# Patient Record
Sex: MALE | Race: ASIAN | NOT HISPANIC OR LATINO | URBAN - METROPOLITAN AREA
[De-identification: names, ages, dates, MRNs, and addresses within clinical notes are randomized per-mention and may not be internally consistent; named-entity substitution may affect disease eponyms.]

---

## 2021-11-26 ENCOUNTER — EMERGENCY (EMERGENCY)
Facility: HOSPITAL | Age: 41
LOS: 1 days | Discharge: HOME | End: 2021-11-26
Attending: EMERGENCY MEDICINE
Payer: MEDICAID

## 2021-11-26 VITALS
TEMPERATURE: 98 F | OXYGEN SATURATION: 99 % | HEART RATE: 92 BPM | DIASTOLIC BLOOD PRESSURE: 74 MMHG | RESPIRATION RATE: 16 BRPM | WEIGHT: 167.99 LBS | HEIGHT: 63 IN | SYSTOLIC BLOOD PRESSURE: 133 MMHG

## 2021-11-26 PROCEDURE — 70450 CT HEAD/BRAIN W/O DYE: CPT | Mod: 26,MA

## 2021-11-26 PROCEDURE — 70450 CT HEAD/BRAIN W/O DYE: CPT | Mod: MA

## 2021-11-26 PROCEDURE — 99284 EMERGENCY DEPT VISIT MOD MDM: CPT | Mod: 25

## 2021-11-26 PROCEDURE — 99284 EMERGENCY DEPT VISIT MOD MDM: CPT

## 2021-11-26 PROCEDURE — 96372 THER/PROPH/DIAG INJ SC/IM: CPT

## 2021-11-26 RX ORDER — KETOROLAC TROMETHAMINE 30 MG/ML
30 SYRINGE (ML) INJECTION ONCE
Refills: 0 | Status: DISCONTINUED | OUTPATIENT
Start: 2021-11-26 | End: 2021-11-26

## 2021-11-26 RX ORDER — CYCLOBENZAPRINE HYDROCHLORIDE 10 MG/1
1 TABLET, FILM COATED ORAL
Qty: 14 | Refills: 0
Start: 2021-11-26 | End: 2021-12-02

## 2021-11-26 RX ADMIN — Medication 30 MILLIGRAM(S): at 18:51

## 2021-11-26 NOTE — ED ADULT NURSE NOTE - DISCHARGE DATE/TIME
26-Nov-2021 20:23 Dapsone Counseling: I discussed with the patient the risks of dapsone including but not limited to hemolytic anemia, agranulocytosis, rashes, methemoglobinemia, kidney failure, peripheral neuropathy, headaches, GI upset, and liver toxicity.  Patients who start dapsone require monitoring including baseline LFTs and weekly CBCs for the first month, then every month thereafter.  The patient verbalized understanding of the proper use and possible adverse effects of dapsone.  All of the patient's questions and concerns were addressed.

## 2021-11-26 NOTE — ED PROVIDER NOTE - OBJECTIVE STATEMENT
40 year old male with no significant PMHx presents to the ED with complaints of right sided localized occipital headache over upper neck area. The patient states he often gets tension headaches but this one is worse. He described the headache as throbbing in nature. He denies nausea, and vomiting. NKDA.

## 2021-11-26 NOTE — ED PROVIDER NOTE - NSFOLLOWUPINSTRUCTIONS_ED_ALL_ED_FT
[facility]                                                                          Tension Headache, Adult    A tension headache is a feeling of pain, pressure, or aching in the head that is often felt over the front and sides of the head. The pain can be dull, or it can feel tight (constricting). There are two types of tension headache:  •Episodic tension headache. This is when the headaches happen fewer than 15 days a month.      •Chronic tension headache. This is when the headaches happen more than 15 days a month during a 3-month period.      A tension headache can last from 30 minutes to several days. It is the most common kind of headache. Tension headaches are not normally associated with nausea or vomiting, and they do not get worse with physical activity.      What are the causes?  The exact cause of this condition is not known. Tension headaches are often triggered by stress, anxiety, or depression. Other triggers include:  •Alcohol.      •Too much caffeine or caffeine withdrawal.      •Respiratory infections, such as colds, flu, or sinus infections.      •Dental problems or teeth clenching.      •Tiredness (fatigue).      •Holding your head and neck in the same position for a long period of time, such as while using a computer.      •Smoking.      •Arthritis of the neck.        What are the signs or symptoms?  Symptoms of this condition include:  •A feeling of pressure or tightness around the head.      •Dull, aching head pain.      •Pain over the front and sides of the head.      •Tenderness in the muscles of the head, neck, and shoulders.        How is this diagnosed?    This condition may be diagnosed based on your symptoms, your medical history, and a physical exam. If your symptoms are severe or unusual, you may have imaging tests, such as a CT scan or an MRI of your head. Your vision may also be checked.      How is this treated?    This condition may be treated with lifestyle changes and with medicines that help relieve symptoms.      Follow these instructions at home:    Managing pain     •Take over-the-counter and prescription medicines only as told by your health care provider.      •When you have a headache, lie down in a dark, quiet room.    •If directed, apply ice to the head and neck:  •Put ice in a plastic bag.      •Place a towel between your skin and the bag.      •Leave the ice on for 20 minutes, 2–3 times a day.      •If directed, apply heat to the back of your neck as often as told by your health care provider. Use the heat source that your health care provider recommends, such as a moist heat pack or a heating pad.  •Place a towel between your skin and the heat source.      •Leave the heat on for 20–30 minutes.      •Remove the heat if your skin turns bright red. This is especially important if you are unable to feel pain, heat, or cold. You may have a greater risk of getting burned.        Eating and drinking     •Eat meals on a regular schedule.      •Limit alcohol intake to no more than 1 drink a day for nonpregnant women and 2 drinks a day for men. One drink equals 12 oz of beer, 5 oz of wine, or 1½ oz of hard liquor.      •Drink enough fluid to keep your urine pale yellow.      •Decrease your caffeine intake, or stop using caffeine.      Lifestyle     •Get 7–9 hours of sleep each night, or get the amount of sleep recommended by your health care provider.      •At bedtime, remove all electronic devices from your room. Electronic devices include computers, phones, and tablets.    •Find ways to manage your stress. Some things that can help relieve stress include:  •Exercise.      •Deep breathing exercises.      •Yoga.      •Listening to music.      •Positive mental imagery.        •Try to sit up straight and avoid tensing your muscles.      • Do not use any products that contain nicotine or tobacco, such as cigarettes and e-cigarettes. If you need help quitting, ask your health care provider.        General instructions      •Keep all follow-up visits as told by your health care provider. This is important.    •Avoid any headache triggers. Keep a headache journal to help find out what may trigger your headaches. For example, write down:  •What you eat and drink.      •How much sleep you get.      •Any change to your diet or medicines.          Contact a health care provider if:    •Your headache does not get better.      •Your headache comes back.      •You are sensitive to sounds, light, or smells because of a headache.      •You have nausea or you vomit.      •Your stomach hurts.        Get help right away if:  •You suddenly develop a very severe headache along with any of the following:  •A stiff neck.      •Nausea and vomiting.      •Confusion.      •Weakness.      •Double vision or loss of vision.      •Shortness of breath.      •Rash.      •Unusual sleepiness.      •Fever.      •Trouble speaking.      •Pain in your eyes or ears.      •Trouble walking or balancing.      •Feeling faint or passing out.          Summary    •A tension headache is a feeling of pain, pressure, or aching in the head that is often felt over the front and sides of the head.      •A tension headache can last from 30 minutes to several days. It is the most common kind of headache.      •This condition may be diagnosed based on your symptoms, your medical history, and a physical exam.      •This condition may be treated with lifestyle changes and with medicines that help relieve symptoms.      This information is not intended to replace advice given to you by your health care provider. Make sure you discuss any questions you have with your health care provider.      Document Revised: 10/14/2020 Document Reviewed: 03/30/2018    Elsevier Patient Education © 2021 [employer]

## 2021-11-26 NOTE — ED PROVIDER NOTE - NEUROLOGICAL, MLM
Localized tenderness to occiput. Alert and oriented, no focal deficits, no motor or sensory deficits.

## 2021-11-26 NOTE — ED PROVIDER NOTE - PATIENT PORTAL LINK FT
You can access the FollowMyHealth Patient Portal offered by [facility] by registering at the following website: [email or web address]. By joining [employer]’s FollowMyHealth portal, you will also be able to view your health information using other applications (apps) compatible with our system.

## 2021-11-26 NOTE — ED PROVIDER NOTE - CLINICAL SUMMARY MEDICAL DECISION MAKING FREE TEXT BOX
Give Toradol and perform head CT. Patient refuses getting a lidocaine injection to the area. Give Toradol and perform head CT. Patient refuses getting a lidocaine injection to the area.  patient is stable will discharge on muscle relaxant

## 2023-11-22 ENCOUNTER — EMERGENCY (EMERGENCY)
Facility: HOSPITAL | Age: 43
LOS: 1 days | Discharge: HOME | End: 2023-11-22
Attending: STUDENT IN AN ORGANIZED HEALTH CARE EDUCATION/TRAINING PROGRAM
Payer: MEDICAID

## 2023-11-22 VITALS
TEMPERATURE: 98 F | DIASTOLIC BLOOD PRESSURE: 75 MMHG | WEIGHT: 142.86 LBS | OXYGEN SATURATION: 97 % | HEART RATE: 102 BPM | SYSTOLIC BLOOD PRESSURE: 108 MMHG | RESPIRATION RATE: 22 BRPM

## 2023-11-22 VITALS
RESPIRATION RATE: 16 BRPM | OXYGEN SATURATION: 99 % | DIASTOLIC BLOOD PRESSURE: 80 MMHG | SYSTOLIC BLOOD PRESSURE: 112 MMHG | HEART RATE: 78 BPM | TEMPERATURE: 99 F

## 2023-11-22 PROCEDURE — 71046 X-RAY EXAM CHEST 2 VIEWS: CPT

## 2023-11-22 PROCEDURE — 71046 X-RAY EXAM CHEST 2 VIEWS: CPT | Mod: 26

## 2023-11-22 PROCEDURE — 99284 EMERGENCY DEPT VISIT MOD MDM: CPT

## 2023-11-22 PROCEDURE — 0225U NFCT DS DNA&RNA 21 SARSCOV2: CPT

## 2023-11-22 PROCEDURE — 99283 EMERGENCY DEPT VISIT LOW MDM: CPT | Mod: 25

## 2023-11-22 RX ADMIN — Medication 100 MILLIGRAM(S): at 14:34

## 2023-11-22 NOTE — ED PROVIDER NOTE - NSFOLLOWUPINSTRUCTIONS_ED_ALL_ED_FT
Acute Cough    WHAT YOU NEED TO KNOW:    What is an acute cough? An acute cough can last up to 3 weeks. Common causes of an acute cough include a cold, allergies, or a lung infection.    How is the cause of an acute cough diagnosed? Your healthcare provider will examine you and listen to your lungs. Tell your healthcare provider if you cough up any mucus, or have a fever or shortness of breath. Also tell your provider what makes the cough better or worse. Depending on your symptoms, you may need a chest x-ray. A sample of mucus may be collected and tested for infection.    How is an acute cough treated? An acute cough usually goes away on its own. Ask your healthcare provider about medicines you can take to decrease your cough. You may need medicine to stop the cough, decrease swelling in your airways, or help open your airways. Medicine may also be given to help you cough up mucus. If you have an infection caused by bacteria, you may need antibiotics.    What can I do to manage my cough?    Do not smoke and stay away from others who smoke. Nicotine and other chemicals in cigarettes and cigars can cause lung damage and make your cough worse. Ask your healthcare provider for information if you currently smoke and need help to quit. E-cigarettes or smokeless tobacco still contain nicotine. Talk to your healthcare provider before you use these products.    Drink extra liquids as directed. Liquids will help thin and loosen mucus so you can cough it up. Liquids will also help prevent dehydration. Examples of good liquids to drink include water, fruit juice, and broth. Do not drink liquids that contain caffeine. Caffeine can increase your risk for dehydration. Ask your healthcare provider how much liquid to drink each day.    Rest as directed. Do not do activities that make your cough worse, such as exercise.    Use a humidifier or vaporizer. Use a cool mist humidifier or a vaporizer to increase air moisture in your home. This may make it easier for you to breathe and help decrease your cough.    Eat 2 to 5 mL of honey 2 times each day. Honey can help thin mucus and decrease your cough.    Use cough drops or lozenges. These can help decrease throat irritation and your cough.  When should I seek immediate care?    You have trouble breathing or feel short of breath.    You cough up blood, or you see blood in your mucus.    You faint or feel weak or dizzy.    You have chest pain when you cough or take a deep breath.    You have new wheezing.  When should I contact my healthcare provider?    You have a fever.    Your cough lasts longer than 4 weeks.    Your symptoms do not improve with treatment.    You have questions or concerns about your condition or care.  CARE AGREEMENT:    You have the right to help plan your care. Learn about your health condition and how it may be treated. Discuss treatment options with your healthcare providers to decide what care you want to receive. You always have the right to refuse treatment.

## 2023-11-22 NOTE — ED PROVIDER NOTE - NS ED ROS FT
REVIEW OF SYSTEMS:  CONSTITUTIONAL: No weakness, fevers or chills  EYES: No conjunctiva redness, No eye discharge  ENT: + nasal congestion, No sore throat, No ear pain,   NECK: No pain or stiffness  RESPIRATORY: + cough, No wheezing, hemoptysis; No shortness of breath  CARDIOVASCULAR: No chest pain or palpitations  GASTROINTESTINAL: No abdominal pain. No nausea, vomiting, diarrhea or constipation.  GENITOURINARY: No dysuria, frequency or hematuria  NEUROLOGICAL: No headache, numbness   SKIN: No itching, rashes,   All other review of systems is negative unless indicated above.

## 2023-11-22 NOTE — ED PROVIDER NOTE - OBJECTIVE STATEMENT
43 yo M w/ no PMH present w/ 3wks h/o productive cough(yellow sputum), nasal congestion, runny nose, body aches and subjective fever. Pt reports visiting his PCP 2 wks ago and was prescribed Bromfed w/ no relief. He reports using OTC Rompe and Advil w/ no relief. Denies; chest pain, SOB, wheezing, Abdominal pain, N/V/D. 41 yo M w/ no PMH present w/ 3wks h/o productive cough(yellow sputum), nasal congestion, runny nose, body aches and subjective fever. Pt reports visiting his PCP 2 wks ago and was prescribed Bromfed w/ no relief. He reports using OTC Rompe and Advil w/ no relief. Denies; chest pain, SOB, wheezing, Abdominal pain, N/V/D. 43 yo M w/ no PMH present w/ 3wks h/o productive cough(yellow sputum), nasal congestion, runny nose, body aches and subjective fever. Pt reports visiting his PCP 2 wks ago and was prescribed Bromfed w/ no relief. He reports using OTC Rompe and Advil w/ no relief. Denies; chest pain, SOB, wheezing, Abdominal pain, N/V/D, sore throat, sick contacts, h/o Asthma 41 yo M w/ no PMH present w/ 3wks h/o productive cough(yellow sputum), nasal congestion, runny nose, body aches and subjective fever. Pt reports visiting his PCP 2 wks ago and was prescribed Bromfed w/ no relief. He reports using OTC Rompe and Advil w/ no relief. Denies; chest pain, SOB, wheezing, Abdominal pain, N/V/D, sore throat, sick contacts, h/o Asthma

## 2023-11-22 NOTE — ED PROVIDER NOTE - ATTENDING CONTRIBUTION TO CARE
42-year-old male with no pertinent past medical history presents with cold symptoms x 3 weeks.  Patient reports nasal congestion, rhinorrhea, body aches, chills, and cough for the past 3 weeks.  Patient states he saw his primary care doctor 2 weeks ago and was prescribed Bromfed with little improvement.  Denies any chest pain, difficulty breathing, vomiting, diarrhea, or rash.  Denies any history of tobacco use or asthma.  Denies any additional complaints. On exam, lungs CTAB, uvula midline, no exudates, no pooling of secretions. Patient well appearing, well hydrated appearing. Will obtain XR r/o infiltrate, RVP eval for viral syndrome, likely DC with supportive treatment/outpatient follow up/return precautions.

## 2023-11-22 NOTE — ED PROVIDER NOTE - PHYSICAL EXAMINATION
GENERAL: NAD,   HEAD:  Atraumatic, Normocephalic  EYES: clear conjunctiva, Clear sclera   ENT: Moist mucous membranes  NECK: Supple, No JVD  CHEST/LUNG: Clear to auscultation bilaterally; No rales, rhonchi, wheezing,   HEART: Regular rate and rhythm; No murmurs,   ABDOMEN: Bowel sounds present; Soft, Nontender, Nondistended, Negative Rovsing's sign   EXTREMITIES:  2+ Peripheral Pulses, . No clubbing, cyanosis, or edema  NERVOUS SYSTEM:  AAOX3, speech clear. No deficits   SKIN: No rashes or lesions

## 2023-11-22 NOTE — ED PROVIDER NOTE - PATIENT PORTAL LINK FT
You can access the FollowMyHealth Patient Portal offered by [facility] by registering at the following website: [email or web address]. By joining [employer]s FollowMyHealth portal, you will also be able to view your health information using other applications (apps) compatible with our system. You can access the FollowMyHealth Patient Portal offered by [facility] by registering at the following website: [email or web address]. By joining [employer]’s FollowMyHealth portal, you will also be able to view your health information using other applications (apps) compatible with our system. You can access the FollowMyHealth Patient Portal offered by [facility] by registering at the following website: [email or web address]. By joining [employer]’s FollowMyHealth portal, you will also be able to view your health information using other applications (apps) compatible with our system.

## 2023-11-22 NOTE — ED PROVIDER NOTE - NSSUBSTANCEUSE_GEN_ALL_CORE_SD
never used Abdomen soft, nontender, nondistended, bowel sounds present in all 4 quadrants. Abdomen soft, nontender, nondistended, bowel sounds present in all 4 quadrants. Abdomen soft, nontender, nondistended, bowel sounds present in all 4 quadrants.

## 2024-01-05 ENCOUNTER — EMERGENCY (EMERGENCY)
Facility: HOSPITAL | Age: 44
LOS: 1 days | Discharge: HOME | End: 2024-01-05
Attending: STUDENT IN AN ORGANIZED HEALTH CARE EDUCATION/TRAINING PROGRAM
Payer: MEDICAID

## 2024-01-05 VITALS
DIASTOLIC BLOOD PRESSURE: 69 MMHG | HEIGHT: 63 IN | HEART RATE: 78 BPM | WEIGHT: 166.01 LBS | OXYGEN SATURATION: 98 % | SYSTOLIC BLOOD PRESSURE: 109 MMHG | TEMPERATURE: 98 F | RESPIRATION RATE: 16 BRPM

## 2024-01-05 PROBLEM — Z78.9 OTHER SPECIFIED HEALTH STATUS: Chronic | Status: ACTIVE | Noted: 2023-11-22

## 2024-01-05 LAB
ANION GAP SERPL CALC-SCNC: 4 MMOL/L — LOW (ref 5–17)
ANION GAP SERPL CALC-SCNC: 4 MMOL/L — LOW (ref 5–17)
BASOPHILS # BLD AUTO: 0.07 K/UL — SIGNIFICANT CHANGE UP (ref 0–0.2)
BASOPHILS # BLD AUTO: 0.07 K/UL — SIGNIFICANT CHANGE UP (ref 0–0.2)
BASOPHILS NFR BLD AUTO: 1.1 % — SIGNIFICANT CHANGE UP (ref 0–2)
BASOPHILS NFR BLD AUTO: 1.1 % — SIGNIFICANT CHANGE UP (ref 0–2)
BUN SERPL-MCNC: 12 MG/DL — SIGNIFICANT CHANGE UP (ref 7–18)
BUN SERPL-MCNC: 12 MG/DL — SIGNIFICANT CHANGE UP (ref 7–18)
CALCIUM SERPL-MCNC: 9.9 MG/DL — SIGNIFICANT CHANGE UP (ref 8.4–10.5)
CALCIUM SERPL-MCNC: 9.9 MG/DL — SIGNIFICANT CHANGE UP (ref 8.4–10.5)
CHLORIDE SERPL-SCNC: 106 MMOL/L — SIGNIFICANT CHANGE UP (ref 96–108)
CHLORIDE SERPL-SCNC: 106 MMOL/L — SIGNIFICANT CHANGE UP (ref 96–108)
CO2 SERPL-SCNC: 28 MMOL/L — SIGNIFICANT CHANGE UP (ref 22–31)
CO2 SERPL-SCNC: 28 MMOL/L — SIGNIFICANT CHANGE UP (ref 22–31)
CREAT SERPL-MCNC: 0.98 MG/DL — SIGNIFICANT CHANGE UP (ref 0.5–1.3)
CREAT SERPL-MCNC: 0.98 MG/DL — SIGNIFICANT CHANGE UP (ref 0.5–1.3)
EGFR: 99 ML/MIN/1.73M2 — SIGNIFICANT CHANGE UP
EGFR: 99 ML/MIN/1.73M2 — SIGNIFICANT CHANGE UP
EOSINOPHIL # BLD AUTO: 0.08 K/UL — SIGNIFICANT CHANGE UP (ref 0–0.5)
EOSINOPHIL # BLD AUTO: 0.08 K/UL — SIGNIFICANT CHANGE UP (ref 0–0.5)
EOSINOPHIL NFR BLD AUTO: 1.2 % — SIGNIFICANT CHANGE UP (ref 0–6)
EOSINOPHIL NFR BLD AUTO: 1.2 % — SIGNIFICANT CHANGE UP (ref 0–6)
GLUCOSE SERPL-MCNC: 106 MG/DL — HIGH (ref 70–99)
GLUCOSE SERPL-MCNC: 106 MG/DL — HIGH (ref 70–99)
HCT VFR BLD CALC: 42.5 % — SIGNIFICANT CHANGE UP (ref 39–50)
HCT VFR BLD CALC: 42.5 % — SIGNIFICANT CHANGE UP (ref 39–50)
HGB BLD-MCNC: 14.8 G/DL — SIGNIFICANT CHANGE UP (ref 13–17)
HGB BLD-MCNC: 14.8 G/DL — SIGNIFICANT CHANGE UP (ref 13–17)
IMM GRANULOCYTES NFR BLD AUTO: 0.2 % — SIGNIFICANT CHANGE UP (ref 0–0.9)
IMM GRANULOCYTES NFR BLD AUTO: 0.2 % — SIGNIFICANT CHANGE UP (ref 0–0.9)
LYMPHOCYTES # BLD AUTO: 2.76 K/UL — SIGNIFICANT CHANGE UP (ref 1–3.3)
LYMPHOCYTES # BLD AUTO: 2.76 K/UL — SIGNIFICANT CHANGE UP (ref 1–3.3)
LYMPHOCYTES # BLD AUTO: 42.5 % — SIGNIFICANT CHANGE UP (ref 13–44)
LYMPHOCYTES # BLD AUTO: 42.5 % — SIGNIFICANT CHANGE UP (ref 13–44)
MCHC RBC-ENTMCNC: 27.6 PG — SIGNIFICANT CHANGE UP (ref 27–34)
MCHC RBC-ENTMCNC: 27.6 PG — SIGNIFICANT CHANGE UP (ref 27–34)
MCHC RBC-ENTMCNC: 34.8 GM/DL — SIGNIFICANT CHANGE UP (ref 32–36)
MCHC RBC-ENTMCNC: 34.8 GM/DL — SIGNIFICANT CHANGE UP (ref 32–36)
MCV RBC AUTO: 79.3 FL — LOW (ref 80–100)
MCV RBC AUTO: 79.3 FL — LOW (ref 80–100)
MONOCYTES # BLD AUTO: 0.65 K/UL — SIGNIFICANT CHANGE UP (ref 0–0.9)
MONOCYTES # BLD AUTO: 0.65 K/UL — SIGNIFICANT CHANGE UP (ref 0–0.9)
MONOCYTES NFR BLD AUTO: 10 % — SIGNIFICANT CHANGE UP (ref 2–14)
MONOCYTES NFR BLD AUTO: 10 % — SIGNIFICANT CHANGE UP (ref 2–14)
NEUTROPHILS # BLD AUTO: 2.92 K/UL — SIGNIFICANT CHANGE UP (ref 1.8–7.4)
NEUTROPHILS # BLD AUTO: 2.92 K/UL — SIGNIFICANT CHANGE UP (ref 1.8–7.4)
NEUTROPHILS NFR BLD AUTO: 45 % — SIGNIFICANT CHANGE UP (ref 43–77)
NEUTROPHILS NFR BLD AUTO: 45 % — SIGNIFICANT CHANGE UP (ref 43–77)
NRBC # BLD: 0 /100 WBCS — SIGNIFICANT CHANGE UP (ref 0–0)
NRBC # BLD: 0 /100 WBCS — SIGNIFICANT CHANGE UP (ref 0–0)
PLATELET # BLD AUTO: 238 K/UL — SIGNIFICANT CHANGE UP (ref 150–400)
PLATELET # BLD AUTO: 238 K/UL — SIGNIFICANT CHANGE UP (ref 150–400)
POTASSIUM SERPL-MCNC: 4.4 MMOL/L — SIGNIFICANT CHANGE UP (ref 3.5–5.3)
POTASSIUM SERPL-MCNC: 4.4 MMOL/L — SIGNIFICANT CHANGE UP (ref 3.5–5.3)
POTASSIUM SERPL-SCNC: 4.4 MMOL/L — SIGNIFICANT CHANGE UP (ref 3.5–5.3)
POTASSIUM SERPL-SCNC: 4.4 MMOL/L — SIGNIFICANT CHANGE UP (ref 3.5–5.3)
RBC # BLD: 5.36 M/UL — SIGNIFICANT CHANGE UP (ref 4.2–5.8)
RBC # BLD: 5.36 M/UL — SIGNIFICANT CHANGE UP (ref 4.2–5.8)
RBC # FLD: 12.8 % — SIGNIFICANT CHANGE UP (ref 10.3–14.5)
RBC # FLD: 12.8 % — SIGNIFICANT CHANGE UP (ref 10.3–14.5)
SODIUM SERPL-SCNC: 138 MMOL/L — SIGNIFICANT CHANGE UP (ref 135–145)
SODIUM SERPL-SCNC: 138 MMOL/L — SIGNIFICANT CHANGE UP (ref 135–145)
TROPONIN I, HIGH SENSITIVITY RESULT: 4.5 NG/L — SIGNIFICANT CHANGE UP
TROPONIN I, HIGH SENSITIVITY RESULT: 4.5 NG/L — SIGNIFICANT CHANGE UP
WBC # BLD: 6.49 K/UL — SIGNIFICANT CHANGE UP (ref 3.8–10.5)
WBC # BLD: 6.49 K/UL — SIGNIFICANT CHANGE UP (ref 3.8–10.5)
WBC # FLD AUTO: 6.49 K/UL — SIGNIFICANT CHANGE UP (ref 3.8–10.5)
WBC # FLD AUTO: 6.49 K/UL — SIGNIFICANT CHANGE UP (ref 3.8–10.5)

## 2024-01-05 PROCEDURE — 71046 X-RAY EXAM CHEST 2 VIEWS: CPT | Mod: 26

## 2024-01-05 PROCEDURE — 93010 ELECTROCARDIOGRAM REPORT: CPT

## 2024-01-05 PROCEDURE — 36415 COLL VENOUS BLD VENIPUNCTURE: CPT

## 2024-01-05 PROCEDURE — 85025 COMPLETE CBC W/AUTO DIFF WBC: CPT

## 2024-01-05 PROCEDURE — 71046 X-RAY EXAM CHEST 2 VIEWS: CPT

## 2024-01-05 PROCEDURE — 99285 EMERGENCY DEPT VISIT HI MDM: CPT

## 2024-01-05 PROCEDURE — 84484 ASSAY OF TROPONIN QUANT: CPT

## 2024-01-05 PROCEDURE — 96374 THER/PROPH/DIAG INJ IV PUSH: CPT

## 2024-01-05 PROCEDURE — 93005 ELECTROCARDIOGRAM TRACING: CPT

## 2024-01-05 PROCEDURE — 99284 EMERGENCY DEPT VISIT MOD MDM: CPT | Mod: 25

## 2024-01-05 PROCEDURE — 80048 BASIC METABOLIC PNL TOTAL CA: CPT

## 2024-01-05 RX ORDER — KETOROLAC TROMETHAMINE 30 MG/ML
15 SYRINGE (ML) INJECTION ONCE
Refills: 0 | Status: DISCONTINUED | OUTPATIENT
Start: 2024-01-05 | End: 2024-01-05

## 2024-01-05 RX ADMIN — Medication 15 MILLIGRAM(S): at 14:55

## 2024-01-05 RX ADMIN — Medication 15 MILLIGRAM(S): at 14:25

## 2024-01-05 NOTE — ED PROVIDER NOTE - PATIENT PORTAL LINK FT
You can access the FollowMyHealth Patient Portal offered by [facility] by registering at the following website: [email or web address]. By joining [employer]’s FollowMyHealth portal, you will also be able to view your health information using other applications (apps) compatible with our system. You can access the FollowMyHealth Patient Portal offered by [facility] by registering at the following website: [email or web address]. By joining [employer]’s FollowMyHealth portal, you will also be able to view your health information using other applications (apps) compatible with our system.

## 2024-01-05 NOTE — ED ADULT NURSE NOTE - OBJECTIVE STATEMENT
44 yo male sitting on a chair c/o cough and chest pain and rib pain when he coughs that started 1 month ago. 42 yo male sitting on a chair c/o cough and chest pain and rib pain when he coughs that started 1 month ago.

## 2024-01-05 NOTE — ED PROVIDER NOTE - NS ED ROS FT
positive: cp, cough  negative: f/c/congestion/hemoptysis/sputum production/rhinorrhea/sob/abdominal pain/n/v/d/dysuria/hematuria/leg edema/rash

## 2024-01-05 NOTE — ED PROVIDER NOTE - NSFOLLOWUPINSTRUCTIONS_ED_ALL_ED_FT
1. You were seen for chest pain. A copy of any of your resulted labs, imaging, and findings have been provided to you. Make sure to view any test results that may not have yet resulted at the time of your discharge by creating a FollowMyHealth account at: [email or web address] to sign up for FollowMyHealth. Please review all of your lab, imaging, and all other results in their entirety with your primary care doctor.   2. Continue to take your home medications as prescribed. Take over the counter motrin 600 mg with food every six hours (do not exceed 3,200 mg in a 24 hour period) and supplement with tylenol 650 mg every six hours (do not exceed 4000 mg of tylenol in a 24 hour period and do not drink alcohol while taking tylenol) between the motrin dosages to have a layered effect. Consult a pharmacist or your primary care doctor with any questions.   3. Follow up with your primary care doctor within 48 hours to assess the symptoms you were seen for in the emergency department and to review all results from your visit. If you don't have a doctor, call [phone] to make an appointment.  4. Return immediately to the emergency department for new, persistent, or worsening symptoms or signs. Return immediately to the emergency department if you have chest pain, shortness of breath, loss of consciousness, chest pain, sweating, or fever, or coughing up blood, or leg swelling.   5. For your for health, you should make healthy food choices and be physically active. Also, you should not smoke or use drugs, and you should not drink alcohol in excess. Please visit [email or web address]/healthyliving for resources and more information. 1. You were seen for chest pain. A copy of any of your resulted labs, imaging, and findings have been provided to you. Make sure to view any test results that may not have yet resulted at the time of your discharge by creating a FollowMyHealth account at: [email or web address] to sign up for FollowMyHealth. Please review all of your lab, imaging, and all other results in their entirety with your primary care doctor.   2. Continue to take your home medications as prescribed. Take over the counter motrin 600 mg with food every six hours (do not exceed 3,200 mg in a 24 hour period) and supplement with tylenol 650 mg every six hours (do not exceed 4000 mg of tylenol in a 24 hour period and do not drink alcohol while taking tylenol) between the motrin dosages to have a layered effect. Consult a pharmacist or your primary care doctor with any questions.   3. Follow up with your primary care doctor within 48 hours to assess the symptoms you were seen for in the emergency department and to review all results from your visit. If you don't have a doctor, call [phone] to make an appointment.  4. Return immediately to the emergency department for new, persistent, or worsening symptoms or signs. Return immediately to the emergency department if you have chest pain, shortness of breath, loss of consciousness, chest pain, sweating, or fever, or coughing up blood, or leg swelling.   5. For your for health, you should make healthy food choices and be physically active. Also, you should not smoke or use drugs, and you should not drink alcohol in excess. Please visit [email or web address] for resources and more information.

## 2024-01-05 NOTE — ED PROVIDER NOTE - PHYSICAL EXAMINATION
GENERAL:  Awake, alert and in NAD, non-toxic appearing  ENMT: Airway patent  EYES: conjunctiva clear  CARDIAC: Regular rate, regular rhythm.  Heart sounds S1, S2, no S3, S4. No murmurs, rubs or gallops.  chest wall appears wnl no paradoxical breathing or skin changes  RESPIRATORY: Breath sounds clear and equal in bilateral anterior lung fields, no wheezes/ronchi/crackles/stridor; pt breathing and speaking comfortably with no increased WOB, no accessory mm. use, no intercostal retractions, no nasal flaring  GI: abdomen soft, non-distended, non-tender, no rebound or guarding.  SKIN: warm and dry, no rashes  PSYCH: awake, alert, calm and cooperative  EXTREMITIES: no ble edema  NEURO: gcs 15

## 2024-01-05 NOTE — ED PROVIDER NOTE - OBJECTIVE STATEMENT
43M no PMH/PSH p/w 1 month hx of non-productive cough - pt reports that he started having a non-productive cough 1 m/a, and since then he has bilateral chest/rib pain that occurs only when he coughs. denies hx of similar sx. denies sputum production or hemoptysis. no recent travel/surgery/hospitalization. no personal/FH of DVT/PE. cp is non-exertional, non-pleuritic, and non-positional. pt has no home meds.    per chart review, pt was seen in November 2023 for similar sx and was dx with entero/rhinovirus

## 2024-01-05 NOTE — ED PROVIDER NOTE - CLINICAL SUMMARY MEDICAL DECISION MAKING FREE TEXT BOX
43M no PMH/PSH p/w 1 month hx of non-productive cough - pt reports that he started having a non-productive cough 1 m/a, and since then he has bilateral chest/rib pain that occurs only when he coughs. On exam, /69, VS otherwise wnl, no remarkable exam findings.    ddx: msk pain/costochondritis vs ptx vs pna vs acs. PERC criteria is met and is negative, making pulmonary embolism a less likely dx. Pt is less than 50 years old, has a heart rate < 100 BPM, SaO2 > 94% on room air, no unilateral leg swelling, no hemoptysis, no recent surgery or trauma, no hx of DVT or PE, and no hormone use     Plan:  - ekg: nsr no martha/std/twi  - labs: are wnl, troponin wnl  - cxr: appears clear with no e/o pna or ptx  - toradol given  - Explained to patient that X-rays that are performed in the emergency department are often not read by a radiologist within the duration of the emergency department visit, and that the final X-ray read may be available later today or tomorrow. I told the patient that the clinical decisions I make, if the X-ray is not read by a radiologist prior to the patient being discharged, are based on my own review of the X-ray which I document in the chart [as a "wet read."] My X-ray read is always supposed to be followed up by an attending radiologist read of the X-ray, and if there are new findings or a discrepancy between my read, and the attending radiology read, that the patient is supposed to be contacted by the hospital with these findings. However, I recommended to the patient that they should follow up the final X-ray read later today or tomorrow on [email or web address], and if they are unable to access their final X-ray read on [employer], that they can and should call the hospital to obtain the final read. Pt relays understanding. 43M no PMH/PSH p/w 1 month hx of non-productive cough - pt reports that he started having a non-productive cough 1 m/a, and since then he has bilateral chest/rib pain that occurs only when he coughs. On exam, /69, VS otherwise wnl, no remarkable exam findings.    ddx: msk pain/costochondritis vs ptx vs pna vs acs. PERC criteria is met and is negative, making pulmonary embolism a less likely dx. Pt is less than 50 years old, has a heart rate < 100 BPM, SaO2 > 94% on room air, no unilateral leg swelling, no hemoptysis, no recent surgery or trauma, no hx of DVT or PE, and no hormone use     Plan:  - ekg: nsr no martha/std/twi  - labs: are wnl, troponin wnl  - cxr: appears clear with no e/o pna or ptx  - toradol given  - Explained to patient that X-rays that are performed in the emergency department are often not read by a radiologist within the duration of the emergency department visit, and that the final X-ray read may be available later today or tomorrow. I told the patient that the clinical decisions I make, if the X-ray is not read by a radiologist prior to the patient being discharged, are based on my own review of the X-ray which I document in the chart [as a "wet read."] My X-ray read is always supposed to be followed up by an attending radiologist read of the X-ray, and if there are new findings or a discrepancy between my read, and the attending radiology read, that the patient is supposed to be contacted by the hospital with these findings. However, I recommended to the patient that they should follow up the final X-ray read later today or tomorrow on [email or web address], and if they are unable to access their final X-ray read on [employer], that they can and should call the hospital to obtain the final read. Pt relays understanding.

## 2024-01-05 NOTE — ED ADULT NURSE NOTE - NSFALLUNIVINTERV_ED_ALL_ED
Bed/Stretcher in lowest position, wheels locked, appropriate side rails in place/Call bell, personal items and telephone in reach/Instruct patient to call for assistance before getting out of bed/chair/stretcher/Non-slip footwear applied when patient is off stretcher/[place] to call system/Physically safe environment - no spills, clutter or unnecessary equipment/Purposeful proactive rounding/Room/bathroom lighting operational, light cord in reach Bed/Stretcher in lowest position, wheels locked, appropriate side rails in place/Call bell, personal items and telephone in reach/Instruct patient to call for assistance before getting out of bed/chair/stretcher/Non-slip footwear applied when patient is off stretcher/[place] to call system/Physically safe environment - no spills, clutter or unnecessary equipment/Purposeful proactive rounding/Room/bathroom lighting operational, light cord in reach

## 2025-04-18 ENCOUNTER — EMERGENCY (EMERGENCY)
Facility: HOSPITAL | Age: 45
LOS: 1 days | End: 2025-04-18
Attending: STUDENT IN AN ORGANIZED HEALTH CARE EDUCATION/TRAINING PROGRAM
Payer: MEDICAID

## 2025-04-18 VITALS
HEART RATE: 81 BPM | TEMPERATURE: 98 F | HEIGHT: 63 IN | SYSTOLIC BLOOD PRESSURE: 134 MMHG | RESPIRATION RATE: 18 BRPM | OXYGEN SATURATION: 98 % | DIASTOLIC BLOOD PRESSURE: 81 MMHG | WEIGHT: 144.18 LBS

## 2025-04-18 PROCEDURE — 99283 EMERGENCY DEPT VISIT LOW MDM: CPT

## 2025-04-18 PROCEDURE — 99282 EMERGENCY DEPT VISIT SF MDM: CPT

## 2025-04-18 NOTE — ED PROVIDER NOTE - NSFOLLOWUPCLINICS_GEN_ALL_ED_FT
[facility]  Gastroenterology  [address] [place] [address]  Phone: [phone]  Fax: [phone]

## 2025-04-18 NOTE — ED PROVIDER NOTE - NSTIMEPROVIDERCAREINITIATE_GEN_ER
18-Apr-2025 20:44 Implemented All Universal Safety Interventions:  [place] to call system. Call bell, personal items and telephone within reach. Instruct patient to call for assistance. Room bathroom lighting operational. Non-slip footwear when patient is off stretcher. Physically safe environment: no spills, clutter or unnecessary equipment. Stretcher in lowest position, wheels locked, appropriate side rails in place.

## 2025-04-18 NOTE — ED PROVIDER NOTE - NSFOLLOWUPINSTRUCTIONS_ED_ALL_ED_FT
Please use Preparation H cream or suppository  High Fiber Diet  Sitz Baths   Follow up with GI doctor if symptoms are not improving  Seek Medical attention or return to the emergency department for any new or worsening symptoms.

## 2025-04-18 NOTE — ED PROVIDER NOTE - CLINICAL SUMMARY MEDICAL DECISION MAKING FREE TEXT BOX
Rectal pain with no active bleeding  Suspect hemorrhoid  Topical Treatments, Sitz Bath   Increase Fiber intake  GI follow up PRN.

## 2025-04-18 NOTE — ED PROVIDER NOTE - OBJECTIVE STATEMENT
45 y/o M no sig PMH presenting with pain with defecation    Has been using castor oil without relief. No associated abdominal pain n/v. 43 y/o M no sig PMH presenting with pain with defecation    Has been using castor oil without relief. No associated abdominal pain n/v. No bleeding or history of hemorrhoids. Notes occasional straining over the past 2-3 days. Pain has occurred intermittently over the last month, worse in last two days.

## 2025-04-18 NOTE — ED ADULT NURSE NOTE - OBJECTIVE STATEMENT
Patient is having pain scale 10/10 rectal/ tail bone when having a bowel movement. Patient reports having only 1 bowel movement a day he states that is normal for him. His stools are soft. denies blood in stool. Having to increase effort when pushing.

## 2025-04-18 NOTE — ED PROVIDER NOTE - NSDCPRINTRESULTS_ED_ALL_ED
My chart message sent to patient with provider request. Also noted new medication was sent to patient.  
Please see patient my chart message. What would you like them to set up an OV/evisit/phone visit?   
She is due for an ACT re-score... Can we send out a letter for her to complete?  Otherwise, she can complete it at the clinic.    I can send a refill if she really tries to get that ACT done.  Thanks!  --Dr. Montalvo  
Patient requests all Lab, Cardiology, and Radiology Results on their Discharge Instructions

## 2025-04-18 NOTE — ED ADULT TRIAGE NOTE - CHIEF COMPLAINT QUOTE
" For 2 days its been painful when having bowel movements, I've tried using castor oil it helps little but still too much pain" Denies bleeding, vominting, fever, or any PMH.

## 2025-04-18 NOTE — ED PROVIDER NOTE - PATIENT PORTAL LINK FT
You can access the FollowMyHealth Patient Portal offered by [facility] by registering at the following website: [email or web address]. By joining [facility]’s FollowMyHealth portal, you will also be able to view your health information using other applications (apps) compatible with our system.

## 2025-05-07 ENCOUNTER — APPOINTMENT (OUTPATIENT)
Dept: GASTROENTEROLOGY | Facility: CLINIC | Age: 45
End: 2025-05-07

## 2025-05-14 PROBLEM — Z00.00 ENCOUNTER FOR PREVENTIVE HEALTH EXAMINATION: Status: ACTIVE | Noted: 2025-05-14

## 2025-05-15 ENCOUNTER — APPOINTMENT (OUTPATIENT)
Dept: GASTROENTEROLOGY | Facility: CLINIC | Age: 45
End: 2025-05-15
Payer: MEDICAID

## 2025-05-15 VITALS
DIASTOLIC BLOOD PRESSURE: 78 MMHG | OXYGEN SATURATION: 97 % | BODY MASS INDEX: 24.98 KG/M2 | TEMPERATURE: 97.3 F | HEART RATE: 93 BPM | SYSTOLIC BLOOD PRESSURE: 117 MMHG | HEIGHT: 63 IN | WEIGHT: 141 LBS

## 2025-05-15 DIAGNOSIS — Z72.0 TOBACCO USE: ICD-10-CM

## 2025-05-15 DIAGNOSIS — K64.9 UNSPECIFIED HEMORRHOIDS: ICD-10-CM

## 2025-05-15 DIAGNOSIS — K59.00 CONSTIPATION, UNSPECIFIED: ICD-10-CM

## 2025-05-15 PROCEDURE — 99203 OFFICE O/P NEW LOW 30 MIN: CPT

## 2025-05-15 RX ORDER — POLYETHYLENE GLYCOL 3350 17 G/17G
17 POWDER, FOR SOLUTION ORAL DAILY
Qty: 30 | Refills: 3 | Status: ACTIVE | COMMUNITY
Start: 2025-05-15 | End: 1900-01-01

## 2025-05-15 RX ORDER — HYDROCORTISONE 25 MG/G
2.5 CREAM TOPICAL DAILY
Qty: 1 | Refills: 1 | Status: ACTIVE | COMMUNITY
Start: 2025-05-15 | End: 1900-01-01